# Patient Record
Sex: FEMALE | Race: WHITE | NOT HISPANIC OR LATINO | URBAN - METROPOLITAN AREA
[De-identification: names, ages, dates, MRNs, and addresses within clinical notes are randomized per-mention and may not be internally consistent; named-entity substitution may affect disease eponyms.]

---

## 2024-09-30 ENCOUNTER — HOSPITAL ENCOUNTER (INPATIENT)
Facility: HOSPITAL | Age: 34
LOS: 2 days | Discharge: HOME | End: 2024-10-02
Attending: STUDENT IN AN ORGANIZED HEALTH CARE EDUCATION/TRAINING PROGRAM | Admitting: STUDENT IN AN ORGANIZED HEALTH CARE EDUCATION/TRAINING PROGRAM
Payer: COMMERCIAL

## 2024-09-30 PROBLEM — Z3A.39 39 WEEKS GESTATION OF PREGNANCY: Status: ACTIVE | Noted: 2024-09-30

## 2024-09-30 LAB
ABO GROUP BLD: NORMAL
ALP SERPL-CCNC: 192 U/L (ref 39–117)
ALT SERPL W P-5'-P-CCNC: 11 U/L (ref 1–33)
AST SERPL-CCNC: 25 U/L (ref 1–32)
BILIRUB SERPL-MCNC: 0.3 MG/DL (ref 0–1.2)
BLD GP AB SCN SERPL QL: POSITIVE
CREAT SERPL-MCNC: 0.56 MG/DL (ref 0.57–1)
DEPRECATED RDW RBC AUTO: 37.5 FL (ref 37–54)
ERYTHROCYTE [DISTWIDTH] IN BLOOD BY AUTOMATED COUNT: 12.7 % (ref 12.3–15.4)
HCT VFR BLD AUTO: 35.8 % (ref 34–46.6)
HGB BLD-MCNC: 12.3 G/DL (ref 12–15.9)
LDH SERPL-CCNC: 192 U/L (ref 135–214)
MCH RBC QN AUTO: 28.1 PG (ref 26.6–33)
MCHC RBC AUTO-ENTMCNC: 34.4 G/DL (ref 31.5–35.7)
MCV RBC AUTO: 81.7 FL (ref 79–97)
PLATELET # BLD AUTO: 250 10*3/MM3 (ref 140–450)
PMV BLD AUTO: 11.1 FL (ref 6–12)
RBC # BLD AUTO: 4.38 10*6/MM3 (ref 3.77–5.28)
RESIDUAL RHIG DETECTED: NORMAL
RH BLD: NEGATIVE
T&S EXPIRATION DATE: NORMAL
URATE SERPL-MCNC: 3.5 MG/DL (ref 2.4–5.7)
WBC NRBC COR # BLD AUTO: 13.29 10*3/MM3 (ref 3.4–10.8)

## 2024-09-30 PROCEDURE — 86901 BLOOD TYPING SEROLOGIC RH(D): CPT | Performed by: OBSTETRICS & GYNECOLOGY

## 2024-09-30 PROCEDURE — 86870 RBC ANTIBODY IDENTIFICATION: CPT | Performed by: OBSTETRICS & GYNECOLOGY

## 2024-09-30 PROCEDURE — 86900 BLOOD TYPING SEROLOGIC ABO: CPT | Performed by: OBSTETRICS & GYNECOLOGY

## 2024-09-30 PROCEDURE — 82247 BILIRUBIN TOTAL: CPT | Performed by: OBSTETRICS & GYNECOLOGY

## 2024-09-30 PROCEDURE — 86850 RBC ANTIBODY SCREEN: CPT | Performed by: OBSTETRICS & GYNECOLOGY

## 2024-09-30 PROCEDURE — 85027 COMPLETE CBC AUTOMATED: CPT | Performed by: OBSTETRICS & GYNECOLOGY

## 2024-09-30 PROCEDURE — 84075 ASSAY ALKALINE PHOSPHATASE: CPT | Performed by: OBSTETRICS & GYNECOLOGY

## 2024-09-30 PROCEDURE — 82565 ASSAY OF CREATININE: CPT | Performed by: OBSTETRICS & GYNECOLOGY

## 2024-09-30 PROCEDURE — 86780 TREPONEMA PALLIDUM: CPT | Performed by: OBSTETRICS & GYNECOLOGY

## 2024-09-30 PROCEDURE — 84550 ASSAY OF BLOOD/URIC ACID: CPT | Performed by: OBSTETRICS & GYNECOLOGY

## 2024-09-30 PROCEDURE — 83615 LACTATE (LD) (LDH) ENZYME: CPT | Performed by: OBSTETRICS & GYNECOLOGY

## 2024-09-30 PROCEDURE — 84450 TRANSFERASE (AST) (SGOT): CPT | Performed by: OBSTETRICS & GYNECOLOGY

## 2024-09-30 PROCEDURE — 84460 ALANINE AMINO (ALT) (SGPT): CPT | Performed by: OBSTETRICS & GYNECOLOGY

## 2024-09-30 RX ORDER — SODIUM CHLORIDE 0.9 % (FLUSH) 0.9 %
10 SYRINGE (ML) INJECTION AS NEEDED
Status: DISCONTINUED | OUTPATIENT
Start: 2024-09-30 | End: 2024-10-01 | Stop reason: HOSPADM

## 2024-09-30 RX ORDER — FENTANYL CITRATE 50 UG/ML
100 INJECTION, SOLUTION INTRAMUSCULAR; INTRAVENOUS
Status: DISCONTINUED | OUTPATIENT
Start: 2024-09-30 | End: 2024-10-01

## 2024-09-30 RX ORDER — FAMOTIDINE 10 MG/ML
20 INJECTION, SOLUTION INTRAVENOUS EVERY 12 HOURS PRN
Status: DISCONTINUED | OUTPATIENT
Start: 2024-09-30 | End: 2024-10-01 | Stop reason: HOSPADM

## 2024-09-30 RX ORDER — LIDOCAINE HYDROCHLORIDE 10 MG/ML
0.5 INJECTION, SOLUTION EPIDURAL; INFILTRATION; INTRACAUDAL; PERINEURAL ONCE AS NEEDED
Status: DISCONTINUED | OUTPATIENT
Start: 2024-09-30 | End: 2024-10-01 | Stop reason: HOSPADM

## 2024-09-30 RX ORDER — SODIUM CHLORIDE 0.9 % (FLUSH) 0.9 %
10 SYRINGE (ML) INJECTION EVERY 12 HOURS SCHEDULED
Status: DISCONTINUED | OUTPATIENT
Start: 2024-09-30 | End: 2024-10-01 | Stop reason: HOSPADM

## 2024-09-30 RX ORDER — FAMOTIDINE 20 MG/1
20 TABLET, FILM COATED ORAL EVERY 12 HOURS PRN
Status: DISCONTINUED | OUTPATIENT
Start: 2024-09-30 | End: 2024-10-01 | Stop reason: HOSPADM

## 2024-09-30 RX ORDER — ONDANSETRON 2 MG/ML
4 INJECTION INTRAMUSCULAR; INTRAVENOUS EVERY 6 HOURS PRN
Status: DISCONTINUED | OUTPATIENT
Start: 2024-09-30 | End: 2024-10-01 | Stop reason: HOSPADM

## 2024-09-30 RX ORDER — SODIUM CHLORIDE 9 MG/ML
40 INJECTION, SOLUTION INTRAVENOUS AS NEEDED
Status: DISCONTINUED | OUTPATIENT
Start: 2024-09-30 | End: 2024-10-01 | Stop reason: HOSPADM

## 2024-09-30 RX ORDER — MAGNESIUM CARB/ALUMINUM HYDROX 105-160MG
30 TABLET,CHEWABLE ORAL ONCE
Status: DISCONTINUED | OUTPATIENT
Start: 2024-09-30 | End: 2024-10-01 | Stop reason: HOSPADM

## 2024-09-30 RX ORDER — SODIUM CHLORIDE, SODIUM LACTATE, POTASSIUM CHLORIDE, CALCIUM CHLORIDE 600; 310; 30; 20 MG/100ML; MG/100ML; MG/100ML; MG/100ML
125 INJECTION, SOLUTION INTRAVENOUS CONTINUOUS
Status: DISCONTINUED | OUTPATIENT
Start: 2024-09-30 | End: 2024-10-01

## 2024-09-30 RX ORDER — ONDANSETRON 4 MG/1
4 TABLET, ORALLY DISINTEGRATING ORAL EVERY 6 HOURS PRN
Status: DISCONTINUED | OUTPATIENT
Start: 2024-09-30 | End: 2024-10-01 | Stop reason: HOSPADM

## 2024-09-30 RX ORDER — FENTANYL CITRATE 50 UG/ML
50 INJECTION, SOLUTION INTRAMUSCULAR; INTRAVENOUS
Status: DISCONTINUED | OUTPATIENT
Start: 2024-09-30 | End: 2024-10-01

## 2024-09-30 RX ORDER — ACETAMINOPHEN 325 MG/1
650 TABLET ORAL EVERY 4 HOURS PRN
Status: DISCONTINUED | OUTPATIENT
Start: 2024-09-30 | End: 2024-10-01 | Stop reason: HOSPADM

## 2024-10-01 PROBLEM — Z3A.39 39 WEEKS GESTATION OF PREGNANCY: Status: RESOLVED | Noted: 2024-09-30 | Resolved: 2024-10-01

## 2024-10-01 LAB — TREPONEMA PALLIDUM IGG+IGM AB [PRESENCE] IN SERUM OR PLASMA BY IMMUNOASSAY: NORMAL

## 2024-10-01 PROCEDURE — 0KQM0ZZ REPAIR PERINEUM MUSCLE, OPEN APPROACH: ICD-10-PCS | Performed by: ADVANCED PRACTICE MIDWIFE

## 2024-10-01 PROCEDURE — 25010000002 LIDOCAINE 1 % SOLUTION

## 2024-10-01 PROCEDURE — 59025 FETAL NON-STRESS TEST: CPT

## 2024-10-01 RX ORDER — MISOPROSTOL 200 UG/1
800 TABLET ORAL ONCE AS NEEDED
Status: DISCONTINUED | OUTPATIENT
Start: 2024-10-01 | End: 2024-10-01 | Stop reason: HOSPADM

## 2024-10-01 RX ORDER — BISACODYL 10 MG
10 SUPPOSITORY, RECTAL RECTAL DAILY PRN
Status: DISCONTINUED | OUTPATIENT
Start: 2024-10-02 | End: 2024-10-02 | Stop reason: HOSPADM

## 2024-10-01 RX ORDER — HYDROCODONE BITARTRATE AND ACETAMINOPHEN 5; 325 MG/1; MG/1
1 TABLET ORAL EVERY 4 HOURS PRN
Status: DISCONTINUED | OUTPATIENT
Start: 2024-10-01 | End: 2024-10-02 | Stop reason: HOSPADM

## 2024-10-01 RX ORDER — OXYTOCIN/0.9 % SODIUM CHLORIDE 30/500 ML
PLASTIC BAG, INJECTION (ML) INTRAVENOUS
Status: DISCONTINUED
Start: 2024-10-01 | End: 2024-10-02 | Stop reason: HOSPADM

## 2024-10-01 RX ORDER — OXYTOCIN/0.9 % SODIUM CHLORIDE 30/500 ML
250 PLASTIC BAG, INJECTION (ML) INTRAVENOUS CONTINUOUS
Status: ACTIVE | OUTPATIENT
Start: 2024-10-01 | End: 2024-10-01

## 2024-10-01 RX ORDER — PRENATAL VIT/IRON FUM/FOLIC AC 27MG-0.8MG
1 TABLET ORAL DAILY
Status: DISCONTINUED | OUTPATIENT
Start: 2024-10-01 | End: 2024-10-02 | Stop reason: HOSPADM

## 2024-10-01 RX ORDER — DOCUSATE SODIUM 100 MG/1
100 CAPSULE, LIQUID FILLED ORAL 2 TIMES DAILY
Status: DISCONTINUED | OUTPATIENT
Start: 2024-10-01 | End: 2024-10-02 | Stop reason: HOSPADM

## 2024-10-01 RX ORDER — ONDANSETRON 2 MG/ML
4 INJECTION INTRAMUSCULAR; INTRAVENOUS EVERY 6 HOURS PRN
Status: DISCONTINUED | OUTPATIENT
Start: 2024-10-01 | End: 2024-10-02 | Stop reason: HOSPADM

## 2024-10-01 RX ORDER — HYDROCODONE BITARTRATE AND ACETAMINOPHEN 10; 325 MG/1; MG/1
1 TABLET ORAL EVERY 4 HOURS PRN
Status: DISCONTINUED | OUTPATIENT
Start: 2024-10-01 | End: 2024-10-02 | Stop reason: HOSPADM

## 2024-10-01 RX ORDER — CARBOPROST TROMETHAMINE 250 UG/ML
250 INJECTION, SOLUTION INTRAMUSCULAR
Status: DISCONTINUED | OUTPATIENT
Start: 2024-10-01 | End: 2024-10-01 | Stop reason: HOSPADM

## 2024-10-01 RX ORDER — HYDROCORTISONE 25 MG/G
1 CREAM TOPICAL AS NEEDED
Status: DISCONTINUED | OUTPATIENT
Start: 2024-10-01 | End: 2024-10-02 | Stop reason: HOSPADM

## 2024-10-01 RX ORDER — METHYLERGONOVINE MALEATE 0.2 MG/ML
200 INJECTION INTRAVENOUS ONCE AS NEEDED
Status: DISCONTINUED | OUTPATIENT
Start: 2024-10-01 | End: 2024-10-01 | Stop reason: HOSPADM

## 2024-10-01 RX ORDER — ACETAMINOPHEN 325 MG/1
650 TABLET ORAL EVERY 6 HOURS PRN
Status: DISCONTINUED | OUTPATIENT
Start: 2024-10-01 | End: 2024-10-02 | Stop reason: HOSPADM

## 2024-10-01 RX ORDER — LIDOCAINE HYDROCHLORIDE 10 MG/ML
INJECTION, SOLUTION INFILTRATION; PERINEURAL
Status: COMPLETED
Start: 2024-10-01 | End: 2024-10-01

## 2024-10-01 RX ORDER — OXYTOCIN/0.9 % SODIUM CHLORIDE 30/500 ML
999 PLASTIC BAG, INJECTION (ML) INTRAVENOUS ONCE
Status: COMPLETED | OUTPATIENT
Start: 2024-10-01 | End: 2024-10-01

## 2024-10-01 RX ORDER — OXYTOCIN/0.9 % SODIUM CHLORIDE 30/500 ML
125 PLASTIC BAG, INJECTION (ML) INTRAVENOUS ONCE AS NEEDED
Status: DISCONTINUED | OUTPATIENT
Start: 2024-10-01 | End: 2024-10-02 | Stop reason: HOSPADM

## 2024-10-01 RX ORDER — SODIUM CHLORIDE 0.9 % (FLUSH) 0.9 %
1-10 SYRINGE (ML) INJECTION AS NEEDED
Status: DISCONTINUED | OUTPATIENT
Start: 2024-10-01 | End: 2024-10-02 | Stop reason: HOSPADM

## 2024-10-01 RX ORDER — IBUPROFEN 600 MG/1
600 TABLET, FILM COATED ORAL EVERY 6 HOURS PRN
Status: DISCONTINUED | OUTPATIENT
Start: 2024-10-01 | End: 2024-10-02 | Stop reason: HOSPADM

## 2024-10-01 RX ADMIN — PRENATAL VITAMINS-IRON FUMARATE 27 MG IRON-FOLIC ACID 0.8 MG TABLET 1 TABLET: at 07:42

## 2024-10-01 RX ADMIN — IBUPROFEN 600 MG: 600 TABLET, FILM COATED ORAL at 17:24

## 2024-10-01 RX ADMIN — ACETAMINOPHEN 650 MG: 325 TABLET ORAL at 20:50

## 2024-10-01 RX ADMIN — IBUPROFEN 600 MG: 600 TABLET, FILM COATED ORAL at 07:41

## 2024-10-01 RX ADMIN — ACETAMINOPHEN 650 MG: 325 TABLET ORAL at 03:28

## 2024-10-01 RX ADMIN — WITCH HAZEL: 500 SOLUTION RECTAL; TOPICAL at 03:28

## 2024-10-01 RX ADMIN — DOCUSATE SODIUM 100 MG: 100 CAPSULE, LIQUID FILLED ORAL at 20:50

## 2024-10-01 RX ADMIN — Medication 999 ML/HR: at 00:20

## 2024-10-01 RX ADMIN — DOCUSATE SODIUM 100 MG: 100 CAPSULE, LIQUID FILLED ORAL at 07:42

## 2024-10-01 RX ADMIN — Medication: at 03:28

## 2024-10-01 RX ADMIN — ACETAMINOPHEN 650 MG: 325 TABLET ORAL at 10:49

## 2024-10-01 RX ADMIN — Medication 1 APPLICATION: at 03:28

## 2024-10-01 RX ADMIN — LIDOCAINE HYDROCHLORIDE 50 ML: 10 INJECTION, SOLUTION INFILTRATION; PERINEURAL at 00:20

## 2024-10-01 NOTE — PLAN OF CARE
Problem: Adult Inpatient Plan of Care  Goal: Plan of Care Review  Outcome: Met  Goal: Patient-Specific Goal (Individualized)  Outcome: Met  Goal: Absence of Hospital-Acquired Illness or Injury  Outcome: Met  Intervention: Identify and Manage Fall Risk  Recent Flowsheet Documentation  Taken 9/30/2024 2143 by Shante Swan, RN  Safety Promotion/Fall Prevention: safety round/check completed  Intervention: Prevent Skin Injury  Recent Flowsheet Documentation  Taken 10/1/2024 0058 by Shante Swan RN  Body Position: sitting up in bed  Taken 9/30/2024 2143 by Shante Swan RN  Body Position: sitting up in bed  Intervention: Prevent and Manage VTE (Venous Thromboembolism) Risk  Recent Flowsheet Documentation  Taken 10/1/2024 0058 by Shante Swan RN  Activity Management: bedrest  VTE Prevention/Management: sequential compression devices off  Taken 9/30/2024 2143 by Shante Swan, RN  Activity Management: up ad nida  VTE Prevention/Management: sequential compression devices off  Goal: Optimal Comfort and Wellbeing  Outcome: Met  Intervention: Provide Person-Centered Care  Recent Flowsheet Documentation  Taken 10/1/2024 0058 by Shante Swan RN  Trust Relationship/Rapport:   care explained   questions answered  Taken 9/30/2024 2143 by Shante Swan RN  Trust Relationship/Rapport:   care explained   questions answered  Goal: Readiness for Transition of Care  Outcome: Met   Goal Outcome Evaluation:

## 2024-10-01 NOTE — H&P
"[address] [place] [address]  Obstetric History and Physical    Contractions.      Subjective     Patient is a 33 y.o. female  currently at 39w2d, who presents for term labor with possible ROM. She voices good fetal movement. She denies vaginal bleeding. She notes possible leaking of fluid. She plans no epidural for labor and birth.     Her prenatal care is complicated by hyperthyroidism with normal labs, not on medication.  Her previous obstetric/gynecological history is non-contributory.    The following portions of the patients history were reviewed and updated as appropriate: current medications, allergies, past medical history, past surgical history, past family history, past social history, and problem list .       Prenatal Information:   Maternal Prenatal Labs  Blood Type No results found for: \"ABO\"   Rh Status No results found for: \"RH\"   Antibody Screen No results found for: \"ABSCRN\"   Gonnorhea No results found for: \"GCCX\"   Chlamydia No results found for: \"CLAMYDCU\"   RPR No results found for: \"RPR\"   Syphilis Antibody No results found for: \"SYPHILIS\"   Rubella No results found for: \"RUBELLAIGGIN\"   Hepatitis B Surface Antigen No results found for: \"HEPBSAG\"   HIV-1 Antibody No results found for: \"LABHIV1\"   Hepatitis C Antibody No results found for: \"HEPCAB\"   Rapid [place] Drug Screen No results found for: \"AMPMETHU\", \"BARBITSCNUR\", \"LABBENZSCN\", \"LABMETHSCN\", \"LABOPIASCN\", \"THCURSCR\", \"COCAINEUR\", \"AMPHETSCREEN\", \"PROPOXSCN\", \"BUPRENORSCNU\", \"METAMPSCNUR\", \"OXYCODONESCN\", \"TRICYCLICSCN\"   Group B Strep Culture Negative                 Past OB History:       OB History    Para Term  AB Living   1 0 0 0 0 0   SAB IAB Ectopic Molar Multiple Live Births   0 0 0 0 0 0      # Outcome Date GA Lbr Leonel/2nd Weight Sex Type Anes PTL Lv   1 Current                Past Medical History: Past Medical History:   Diagnosis Date    Hyperthyroidism     Hyperthyroidism affecting pregnancy in first trimester       Past " Surgical History No past surgical history on file.   Family History: Family History   Problem Relation Age of Onset    Thyroid disease Mother     Diabetes Father     Hypertension Father       Social History:  reports that she has never smoked. She has never used smokeless tobacco.   reports that she does not currently use alcohol.   reports no history of drug use.        REVIEW OF SYSTEMS             Reports fetal movement is normal             Reports leakage of amniotic fluid.             Denies vaginal bleeding             She reports Regular contractions, frequency: Every 2-3 minutes, intensity moderate             All other systems reviewed and are negative    Objective       Vital Signs Range for the last 24 hours  Temperature:     Temp Source:     BP:     Pulse:     Respirations:     SPO2:     O2 Amount (l/min):     O2 Devices     Weight:         Presentation: vertex   Cervix: Exam by:  RN   Dilation:  6   Effacement: Cervical Effacement: %   Station:  0       Fetal Heart Rate Assessment   Method:  external   Beats/min:     Baseline:  140   Varibility:  moderate   Accels:  present   Decels:  absent   Tracing Category:  1    NST: REACTIVE     Uterine Assessment   Method:  external   Frequency (min):  2-3   Ctx Count in 10 min:     Duration:  60-80 seconds   Intensity:  moderate   Intensity by IUPC:     Resting Tone:  Abd soft by palpation   Resting Tone by IUPC:     [place] Units:             Constitutional:  Well developed, well nourished, no acute distress, well-groomed.   Respiratory:  Resp e/e/u, normal breath sounds.   Cardiovascular:  Normal rate and rhythm, no murmurs.   Gastrointestinal:  Soft, gravid, nontender.  Uterus: Soft, nontender. Fundus appropriate for dates.  Neurologic:  Alert & oriented x 3,  no focal deficits noted.   Psychiatric:  Speech and behavior appropriate.   Extremities: no cyanosis, clubbing or edema, no evidence of DVT.        Labs:  Lab Results   Component Value Date     WBC 13.29 (H) 09/30/2024    HGB 12.3 09/30/2024    HCT 35.8 09/30/2024    MCV 81.7 09/30/2024     09/30/2024    CREATININE 0.49 (L) 03/01/2024    AST 26 03/01/2024    ALT 21 03/01/2024               Assessment & Plan       39 weeks gestation of pregnancy        Assessment:  1.  Intrauterine pregnancy at 39w2d weeks gestation with reactive fetal status.    2.   term labor  with ROM  3. Hyperthyroidism  4.  Obstetrical history is non-contributory.  5.  GBS status: Negative    Plan:  1.Expectant management  2. Plan of care has been reviewed with patient and questions answered.  3.  Risks, benefits of treatment plan have been discussed.  4.  All questions have been answered.        Crispin Mckeon CNM  9/30/2024  20:21 EDT

## 2024-10-01 NOTE — LACTATION NOTE
10/01/24 0858 10/01/24 1038   Maternal Information   Date of Referral 10/01/24 10/01/24   Person Making Referral lactation consultant nurse   Maternal Reason for Referral no prior breastfeeding experience breastfeeding currently   Infant Reason for Referral  infant other (see comments)  (infant had gone to nursery for grunting, jitteriness, and chest xray, but being returned to room now for feeding.)   Maternal Assessment   Breast Shape  --  Bilateral:;round   Breast Density  --  Bilateral:;dense   Areola  --  Bilateral:;dense   Nipples  --  Bilateral:;everted;graspable   Left Nipple Symptoms nontender intact;nontender   Right Nipple Symptoms nontender intact;nontender   Maternal Infant Feeding   Maternal Emotional State resistant;tense tense;distracted   Infant Positioning  --  cross-cradle   Signs of Milk Transfer  --  none noted   Pain with Feeding  --  no   Comfort Measures Before/During Feeding  --  infant position adjusted;maternal position adjusted;other (see comments)  (small nipple shield in use)   Latch Assistance  --  full assistance needed   Support Person Involvement actively supporting mother actively supporting mother   Milk Expression/Equipment   Breast Pump Type double electric, personal  ([employer])  --    Breast Pump Flange Size other (see comments)  (measured around 18mm, encouraged purchase of flange insert kit, and to use tightest comfortable fit)  --    Equipment for Home Use breast pump ordered through insurance  --    Breast Pumping   Breast Pumping Interventions other (see comments)  (encouraged to pump for short or missed feedings and with supplementation) post-feed pumping encouraged  (as MOB feels up to it today, due to infant not nursing well)   Lactation Referrals   Lactation Referrals outpatient lactation program  --    Outpatient Lactation Program Lactation Follow-up Date/Time as needed  --      858 Courtesy visit for newly postpartum couplet. Infant asleep on MOB chest.  "Educational handout provided and reviewed. Encouraged to call for assistance with next feeding.    1038 RN called and reported infant had been to nursery for chest xray, blood sugar, and cbc due to jitteriness and grunting. Infant is being returned to MOB now for feeding. LC met them in room. Infant at breast in cross cradle, but unable to latch. LC worked with him for a few min, but ped came in to discuss infant status/care with parents. LC stepped back from bedside. After Ped left, LC placed small nipples shield (was at bedside from earlier feeding), and assisted to latch baby. Baby very disorganized, and grunting increased with his frustration. Pacifier introduced to calm, and regulate suck. Then LC was able to \"bait and switch\" and achieve latch for approximately 4 min. MOB denies pain with latch. Infant then off the breast, and grunting. Infant placed skin to skin with MOB. Encouraged to introduce pumping after feedings as she is feeling up to it today to help establish her supply, but also strongly encouraged rest. Reviewed likelihood of cluster feeding overnight tonight. Encouraged to call for latch assistance with next feeding. Primary RN updated, and encouraged to consider SLP consult, if infant is still having trouble with rhythmic suck after breathing has improved.   "

## 2024-10-01 NOTE — L&D DELIVERY NOTE
[facility]   Vaginal Delivery Note    Patient Name: Sapna Disla  : 1990  MRN: [id number]    Date of Delivery: 10/1/2024     Diagnosis     Pre & Post-Delivery:  Intrauterine pregnancy at 39w3d  Labor status: Spontaneous Onset of Labor      (normal spontaneous vaginal delivery)             Problem List    Transfer to Postpartum     Review the Delivery Report for details.     Delivery     Delivery: Vaginal, Spontaneous     YOB: 2022    Time of Birth:  Gestational Age 12:18 AM   39w3d     Anesthesia: Local  - for repair only   Delivering clinician: Crispin Mckeon    Forceps?   No   Vacuum? No    Shoulder dystocia present: No        Delivery narrative:  Patient at 39w3d pushed to deliver a viable male infant over a second degree laceration without anesthesia. Infant's head, anterior shoulder, and body delivered atraumatically. Vigorous infant was placed on mom's abdomen where the cord was clamped x2 and cut by FOB after a 3-minute delay. Placenta delivered spontaneously and appeared to be intact. Laceration repaired with 3-0 and 4-0 Vicryl rapide after 1% lidocaine numb. EBL 200ml. Mother and infant stable, bonding.         Infant     Findings: male  infant     Infant observations: Weight: 3460 g (7 lb 10.1 oz)   Length: 19  in  Observations/Comments:        Apgars: 8  @ 1 minute /    9  @ 5 minutes   Infant Name: Sterling     Placenta & Cord         Placenta delivered  Spontaneous  at   10/1/2024 12:29 AM     Cord: 3 vessels  present.   Nuchal Cord?  no   Cord blood obtained: Yes    Cord gases obtained:  No              Repair     Episiotomy: None         Lacerations: Yes  Laceration Information  Laceration Repaired?   Perineal: 2nd  Yes    Periurethral:       Labial:       Sulcus:       Vaginal:       Cervical:         Suture used for repair: 3-0, 4-0 Vicryl rapide  Laceration Length for 3rd or 4th degree lacerations: NA   Estimated Blood Loss: Est. Blood Loss (mL): 200 mL  Education Record    Learner:  Patient    Disease / Diagnosis: myeloma    Barriers / Limitations:  None   Comments:    Method:  Discussion   Comments:    General Topics:  Plan of care reviewed   Comments:    Outcome:  Shows understanding   Comments: (Filed from Delivery Summary) (10/01/24 0018)     Quantitative Blood Loss:          Complications     none    Disposition     Mother to Mother Baby/Postpartum  in stable condition currently.  Baby to remains with mom  in stable condition currently.    Crispin Mckeon CNM  10/01/24  01:11 EDT

## 2024-10-01 NOTE — PROGRESS NOTES
[facility]  Obstetric Progress Note    Chief Complaint: PPD #0    Subjective     Sapna doing well. Pain is well controlled. Reports light lochia without passage of major clots. Ambulating. Tolerating PO intake. Voiding without difficulty or dysuria.  Breast feeding. Male infant doing well. Desires circ. Reports good mood.   No further complaints at this time.      Objective     Vital Signs Range for the last 24 hours  Temp:  [97.8 °F (36.6 °C)-99.4 °F (37.4 °C)] 98.6 °F (37 °C)   BP: (116-134)/(62-76) 116/68   Heart Rate:  [71-88] 82   Resp:  [16-18] 18           Device (Oxygen Therapy): room air       Intake/Output last 24 hours:      Intake/Output Summary (Last 24 hours) at 10/1/2024 1258  Last data filed at 10/1/2024 0018  Gross per 24 hour   Intake --   Output 200 ml   Net -200 ml       Intake/Output this shift:    No intake/output data recorded.    Physical Exam:  General: A&Ox3. No acute distress.    HEENT Normocephalic, atraumatic    Heart RRR   Lungs CTAB, unlabored breathing   Abdomen Soft, non tender, negative for guarding and rebound   Extremities Exam of extremities: peripheral pulses normal, no pedal edema, no clubbing or cyanosis   Fundus Firm, midline, below umbilicus       Laboratory Results:  Lab Results   Component Value Date    WBC 13.29 (H) 09/30/2024    HGB 12.3 09/30/2024    HCT 35.8 09/30/2024    MCV 81.7 09/30/2024     09/30/2024    URICACID 3.5 09/30/2024    AST 25 09/30/2024    ALT 11 09/30/2024    HEPBSAG Non-Reactive 02/27/2024         Assessment  PPD# 0 s/p vaginal delivery    Plan  Routine postpartum care.  VSS  Serial lochia, fundal height checks appropriate. Continue serially.   PP Hgb ordered  Pain well controled with tylenol and ibuprofen PRN  Encourage PO hydration, ambulation, IS  Male infant doing well. Desires circ. On ped eval note of mild penilescrotal webbing. Poor feeding/low blood sugar today-will defer circ until tomorrow or outpatient.  Breastfeeding. Lactation  available for consult.       Dispo  Plan for discharge home in 1-2 days pending patient and infant status.        This note has been electronically signed.    Dasha Marin DO  12:58 EDT  October 1, 2024

## 2024-10-02 VITALS
HEART RATE: 68 BPM | WEIGHT: 190 LBS | TEMPERATURE: 98.6 F | DIASTOLIC BLOOD PRESSURE: 69 MMHG | SYSTOLIC BLOOD PRESSURE: 109 MMHG | BODY MASS INDEX: 28.05 KG/M2 | RESPIRATION RATE: 18 BRPM

## 2024-10-02 LAB
BASOPHILS # BLD AUTO: 0.02 10*3/MM3 (ref 0–0.2)
BASOPHILS NFR BLD AUTO: 0.2 % (ref 0–1.5)
DEPRECATED RDW RBC AUTO: 40.5 FL (ref 37–54)
EOSINOPHIL # BLD AUTO: 0.07 10*3/MM3 (ref 0–0.4)
EOSINOPHIL NFR BLD AUTO: 0.7 % (ref 0.3–6.2)
ERYTHROCYTE [DISTWIDTH] IN BLOOD BY AUTOMATED COUNT: 13.2 % (ref 12.3–15.4)
HCT VFR BLD AUTO: 31 % (ref 34–46.6)
HGB BLD-MCNC: 10.2 G/DL (ref 12–15.9)
IMM GRANULOCYTES # BLD AUTO: 0.05 10*3/MM3 (ref 0–0.05)
IMM GRANULOCYTES NFR BLD AUTO: 0.5 % (ref 0–0.5)
LYMPHOCYTES # BLD AUTO: 2.4 10*3/MM3 (ref 0.7–3.1)
LYMPHOCYTES NFR BLD AUTO: 22.5 % (ref 19.6–45.3)
MCH RBC QN AUTO: 28.2 PG (ref 26.6–33)
MCHC RBC AUTO-ENTMCNC: 32.9 G/DL (ref 31.5–35.7)
MCV RBC AUTO: 85.6 FL (ref 79–97)
MONOCYTES # BLD AUTO: 0.65 10*3/MM3 (ref 0.1–0.9)
MONOCYTES NFR BLD AUTO: 6.1 % (ref 5–12)
NEUTROPHILS NFR BLD AUTO: 7.5 10*3/MM3 (ref 1.7–7)
NEUTROPHILS NFR BLD AUTO: 70 % (ref 42.7–76)
NRBC BLD AUTO-RTO: 0 /100 WBC (ref 0–0.2)
PLATELET # BLD AUTO: 168 10*3/MM3 (ref 140–450)
PMV BLD AUTO: 11.4 FL (ref 6–12)
RBC # BLD AUTO: 3.62 10*6/MM3 (ref 3.77–5.28)
WBC NRBC COR # BLD AUTO: 10.69 10*3/MM3 (ref 3.4–10.8)

## 2024-10-02 PROCEDURE — 85025 COMPLETE CBC W/AUTO DIFF WBC: CPT | Performed by: ADVANCED PRACTICE MIDWIFE

## 2024-10-02 RX ORDER — IBUPROFEN 600 MG/1
600 TABLET, FILM COATED ORAL EVERY 6 HOURS PRN
Qty: 60 TABLET | Refills: 0 | Status: SHIPPED | OUTPATIENT
Start: 2024-10-02

## 2024-10-02 RX ORDER — PSEUDOEPHEDRINE HCL 30 MG
100 TABLET ORAL 2 TIMES DAILY
Qty: 60 CAPSULE | Refills: 0 | Status: SHIPPED | OUTPATIENT
Start: 2024-10-02

## 2024-10-02 RX ADMIN — PRENATAL VITAMINS-IRON FUMARATE 27 MG IRON-FOLIC ACID 0.8 MG TABLET 1 TABLET: at 08:13

## 2024-10-02 RX ADMIN — IBUPROFEN 600 MG: 600 TABLET, FILM COATED ORAL at 08:13

## 2024-10-02 RX ADMIN — DOCUSATE SODIUM 100 MG: 100 CAPSULE, LIQUID FILLED ORAL at 08:13

## 2024-10-02 RX ADMIN — WITCH HAZEL: 500 SOLUTION RECTAL; TOPICAL at 14:15

## 2024-10-02 RX ADMIN — IBUPROFEN 600 MG: 600 TABLET, FILM COATED ORAL at 01:05

## 2024-10-02 RX ADMIN — Medication: at 14:15

## 2024-10-02 RX ADMIN — ACETAMINOPHEN 650 MG: 325 TABLET ORAL at 11:24

## 2024-10-02 NOTE — DISCHARGE SUMMARY
[facility]  Discharge Summary      Patient: Sapna Disla            : 1990  MRN: [id number]  CSN: [id number]  Consult:   Consults       No orders found from 2024 to 10/1/2024.               Gestational Age at Discharge:  39w3d, delivered      Admission  Diagnosis: 39 weeks gestation of pregnancy    Discharge Diagnosis:   Patient Active Problem List   Diagnosis    Hyperthyroidism    Multiple thyroid nodules     (normal spontaneous vaginal delivery)       Date of Admission: 2024    Date of Discharge:  10/2/24    Procedures:             Service:  Obstetrics    Hospital Course:       Pt admitted for  labor  after uncomplicated pregnancy. She was delivered without complication. She delivered a VMI with Apgars 8/9 via . See note for full detail. Her postpartum course was uncomplicated and was discharged home on PPD 1    Labs:    Lab Results (last 24 hours)       Procedure Component Value Units Date/Time    CBC & Differential [[id number]]  (Abnormal) Collected: 10/02/24 0501    Specimen: Blood Updated: 10/02/24 0550    Narrative:      The following orders were created for panel order CBC & Differential.  Procedure                               Abnormality         Status                     ---------                               -----------         ------                     CBC Auto Differential[[id number]]        Abnormal            Final result                 Please view results for these tests on the individual orders.    CBC Auto Differential [[id number]]  (Abnormal) Collected: 10/02/24 0501    Specimen: Blood Updated: 10/02/24 0550     WBC 10.69 10*3/mm3      RBC 3.62 10*6/mm3      Hemoglobin 10.2 g/dL      Hematocrit 31.0 %      MCV 85.6 fL      MCH 28.2 pg      MCHC 32.9 g/dL      RDW 13.2 %      RDW-SD 40.5 fl      MPV 11.4 fL      Platelets 168 10*3/mm3      Neutrophil % 70.0 %      Lymphocyte % 22.5 %      Monocyte % 6.1 %      Eosinophil % 0.7 %      Basophil % 0.2 %       Immature Grans % 0.5 %      Neutrophils, Absolute 7.50 10*3/mm3      Lymphocytes, Absolute 2.40 10*3/mm3      Monocytes, Absolute 0.65 10*3/mm3      Eosinophils, Absolute 0.07 10*3/mm3      Basophils, Absolute 0.02 10*3/mm3      Immature Grans, Absolute 0.05 10*3/mm3      nRBC 0.0 /100 WBC           HOSPITAL LABS:  Lab Results   Component Value Date    WBC 10.69 10/02/2024    HGB 10.2 (L) 10/02/2024    HCT 31.0 (L) 10/02/2024    MCV 85.6 10/02/2024     10/02/2024    CREATININE 0.56 (L) 09/30/2024    URICACID 3.5 09/30/2024    AST 25 09/30/2024    ALT 11 09/30/2024     09/30/2024     Results from last 7 days   Lab Units 09/30/24 2012   ABO TYPING  O   RH TYPING  Negative   ANTIBODY SCREEN  Positive       IMAGING        Discharge Medications     Discharge Medications        New Medications        Instructions Start Date   docusate sodium 100 MG capsule   100 mg, Oral, 2 Times Daily      ibuprofen 600 MG tablet  Commonly known as: ADVIL,MOTRIN   600 mg, Oral, Every 6 Hours PRN             Continue These Medications        Instructions Start Date   prenatal vitamin 27-0.8 27-0.8 MG tablet tablet   1 tablet, Oral, Daily             Stop These Medications      doxylamine-pyridoxine 10-10 MG tablet delayed-release EC tablet  Commonly known as: DICLEGIS              Allergies: No Known Allergies     Discharge Disposition:  To Home    Discharge Condition:  Stable    Discharge Diet: Regular    Activity at Discharge: pelvic rest,     Follow-up Appointments: 6 weeks        Joanie Simon MD  10/02/24  09:02 EDT

## 2024-10-02 NOTE — LACTATION NOTE
10/02/24 1039   Maternal Information   Date of Referral 10/02/24   Person Making Referral lactation consultant  (courtesy follow up visit to see how feedings are going for baby/mom. Pt reports feedings are going well & she is still using shield. Enc pt to call outpatient lactation srvc if she needs help after discharge.)   Maternal Infant Feeding   Maternal Emotional State independent;receptive;relaxed   Latch Assistance none needed  (encouraged pt to call if she needs help w/breastfeeding.)

## 2024-10-02 NOTE — PROGRESS NOTES
[facility]  Obstetric Progress Note    Chief Complaint: PPD 1    Subjective     Feeling well. Pain controlled. diana diet. +amb/void. Lochia appr  Objective     Vital Signs Range for the last 24 hours  Temp:  [98.1 °F (36.7 °C)-98.6 °F (37 °C)] 98.6 °F (37 °C)   BP: (102-124)/(58-70) 109/69   Heart Rate:  [68-73] 68   Resp:  [16-18] 18                   Intake/Output last 24 hours:    No intake or output data in the 24 hours ending 10/02/24 0833    Intake/Output this shift:    No intake/output data recorded.    Physical Exam:  General: No acute distress   Heart RRR   Lungs CTAB     Abdomen Soft, non tender, fundus firm   Extremities Exam of extremities: pedal edema tr +       Laboratory Results:  CBC:      Lab 10/02/24  0501 24   WBC 10.69 13.29*   HEMOGLOBIN 10.2* 12.3   HEMATOCRIT 31.0* 35.8   PLATELETS 168 250   NEUTROS ABS 7.50*  --    IMMATURE GRANS (ABS) 0.05  --    LYMPHS ABS 2.40  --    MONOS ABS 0.65  --    EOS ABS 0.07  --    MCV 85.6 81.7          Assessment/Plan: PPD 1 s/p   1.RPPC: Advance care  2. Male infant: circ done  3. breast  4. Rh neg no rhogam needed            Joanie Simon MD  10/2/2024  08:33 EDT

## 2024-10-02 NOTE — PLAN OF CARE
Goal Outcome Evaluation:                      VSS. Voiding without difficulty. Ambulatory. Breastfeeding infant well, no issues per patient. Lochia light, fundus firm. Little to no swelling. Pain well controlled with PRN medications.

## 2024-10-11 ENCOUNTER — MATERNAL SCREENING (OUTPATIENT)
Dept: CALL CENTER | Facility: HOSPITAL | Age: 34
End: 2024-10-11
Payer: COMMERCIAL

## 2024-10-11 NOTE — OUTREACH NOTE
Maternal Screening Survey      Flowsheet Row Responses   Facility patient discharged from? [facility]   Attempt successful? Yes   Call start time 1244   Call end time 1245   EPD Scale: Able to Laugh 0-->as much as she always could   EPD Scale: Looked Forward 0-->as much as she ever did   EPD Scale: Blamed Self 0-->no, never   EPD Scale: Been Anxious 2-->yes, sometimes   EPD Scale: Felt Panicky 0-->no, not at all   EPD Scale: Things Getting on Top 0-->no, has been coping as well as ever   EPD Scale: Difficulty Sleeping 0-->no, not at all   EPD Scale: Sad or Miserable 0-->no, not at all   EPD Scale: Crying 0-->no, never   EPD Scale: Thought of Harming Self 0-->never   [place]  Depression Score 2   Did any of your parents have problems with alcohol or drug use? No   Do any of your peers have problems with alcohol or drug use? No   Does your partner have problems with alcohol or drug use? No   Before you were pregnant did you have problems with alcohol or drug use? (past) No   In the past month, did you drink beer, wine, liquor or use any other drugs? (pregnancy) No   Maternal Screening call completed Yes   Call end time 1245              Damaris LEAL - Registered Nurse